# Patient Record
Sex: MALE | URBAN - METROPOLITAN AREA
[De-identification: names, ages, dates, MRNs, and addresses within clinical notes are randomized per-mention and may not be internally consistent; named-entity substitution may affect disease eponyms.]

---

## 2017-12-13 ENCOUNTER — OFFICE VISIT - HEALTHEAST (OUTPATIENT)
Dept: INTERNAL MEDICINE | Facility: CLINIC | Age: 42
End: 2017-12-13

## 2017-12-13 DIAGNOSIS — Z52.4 KIDNEY DONOR: ICD-10-CM

## 2017-12-13 DIAGNOSIS — Z00.00 ENCOUNTER FOR PREVENTIVE HEALTH EXAMINATION: ICD-10-CM

## 2017-12-13 DIAGNOSIS — J30.89 PERENNIAL ALLERGIC RHINITIS: ICD-10-CM

## 2017-12-13 LAB
CHOLEST SERPL-MCNC: 286 MG/DL
FASTING STATUS PATIENT QL REPORTED: YES
HDLC SERPL-MCNC: 64 MG/DL
LDLC SERPL CALC-MCNC: 192 MG/DL
TRIGL SERPL-MCNC: 150 MG/DL

## 2017-12-13 ASSESSMENT — MIFFLIN-ST. JEOR: SCORE: 1743.12

## 2017-12-14 ENCOUNTER — COMMUNICATION - HEALTHEAST (OUTPATIENT)
Dept: INTERNAL MEDICINE | Facility: CLINIC | Age: 42
End: 2017-12-14

## 2017-12-14 DIAGNOSIS — E78.5 HYPERLIPIDEMIA: ICD-10-CM

## 2017-12-15 ENCOUNTER — COMMUNICATION - HEALTHEAST (OUTPATIENT)
Dept: ONCOLOGY | Facility: HOSPITAL | Age: 42
End: 2017-12-15

## 2018-01-17 ENCOUNTER — COMMUNICATION - HEALTHEAST (OUTPATIENT)
Dept: ONCOLOGY | Facility: HOSPITAL | Age: 43
End: 2018-01-17

## 2018-02-05 ENCOUNTER — AMBULATORY - HEALTHEAST (OUTPATIENT)
Dept: INTERNAL MEDICINE | Facility: CLINIC | Age: 43
End: 2018-02-05

## 2018-02-05 ENCOUNTER — COMMUNICATION - HEALTHEAST (OUTPATIENT)
Dept: INTERNAL MEDICINE | Facility: CLINIC | Age: 43
End: 2018-02-05

## 2018-02-05 RX ORDER — ATORVASTATIN CALCIUM 20 MG/1
20 TABLET, FILM COATED ORAL DAILY
Qty: 30 TABLET | Refills: 11 | Status: SHIPPED | OUTPATIENT
Start: 2018-02-05

## 2018-02-06 ENCOUNTER — AMBULATORY - HEALTHEAST (OUTPATIENT)
Dept: INTERNAL MEDICINE | Facility: CLINIC | Age: 43
End: 2018-02-06

## 2018-02-06 ENCOUNTER — COMMUNICATION - HEALTHEAST (OUTPATIENT)
Dept: INTERNAL MEDICINE | Facility: CLINIC | Age: 43
End: 2018-02-06

## 2018-02-06 DIAGNOSIS — E78.5 HYPERLIPIDEMIA: ICD-10-CM

## 2018-09-19 ENCOUNTER — COMMUNICATION - HEALTHEAST (OUTPATIENT)
Dept: INTERNAL MEDICINE | Facility: CLINIC | Age: 43
End: 2018-09-19

## 2018-09-20 ENCOUNTER — AMBULATORY - HEALTHEAST (OUTPATIENT)
Dept: NURSING | Facility: CLINIC | Age: 43
End: 2018-09-20

## 2021-05-31 VITALS — WEIGHT: 193.7 LBS | HEIGHT: 68 IN | BODY MASS INDEX: 29.36 KG/M2

## 2021-06-01 ENCOUNTER — RECORDS - HEALTHEAST (OUTPATIENT)
Dept: ADMINISTRATIVE | Facility: CLINIC | Age: 46
End: 2021-06-01

## 2021-06-14 NOTE — PROGRESS NOTES
ASSESSMENT:      1. 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     2. Perennial allergic rhinitis  Mild no need for treatment.  Seems related to working around dust in old houses    3. Kidney donor  No history of kidney problems.    - Basic Metabolic Panel      PLAN:  Get fasting lipid profile to screen for high risk hyperlipidemia, given td. Get BMP given history of nephrectomy as living related donor.   Refer to genetic testing as his sister has tested positive for hereditary breast and ovary cancer    Health diet and lifestyle are discussed.       Patient Instructions   Follow general health habits, discussed.  Tetanus booster today.  Will check lipid profile today, fasting, given family history in father of CAD.  Refer to genetic counseling, given sister's recent positive genetic testing for hereditary ovary cancer.  No clinical need at this time for prostate or pancreas testing.      Orders Placed This Encounter   Procedures     Td, Preservative Free (green label)     Lipid [facility] FASTING     Order Specific Question:   Fasting is required?     Answer:   Yes     Basic Metabolic Panel     Ambulatory referral to Genetics     Referral Priority:   Routine     Referral Type:   Consultation     Referral Reason:   Evaluation and Treatment     Requested Specialty:   Genetics     Number of Visits Requested:   1     There are no discontinued medications.    Return if symptoms worsen or fail to improve.    CHIEF COMPLAINT:  Chief Complaint   Patient presents with     Establish Care     pt would like to discuss family propensity for cancer.     HISTORY OF PRESENT ILLNESS:  Stevie Kaur is a 42 y.o. male he feels well.  His sister recently developed localized ovarian cancer.  She is 17 years post renal transplant for non-diabetic renal failure.  She tested  "positive for hereditary ovary/breast cancer.  He gave her his kidney 17 years ago.  Healthy ROS is essentially negative in all systems.  Non-smoker, no alcohol, no history of drug use or abuse.      REVIEW OF SYSTEMS:   See HPI All other systems are negative.    TOBACCO USE:  History   Smoking Status     Never Smoker   Smokeless Tobacco     Never Used       VITALS:  Vitals:    12/13/17 1055   BP: 118/84   Patient Site: Right Arm   Patient Position: Sitting   Cuff Size: Adult Regular   Pulse: 69   Weight: 193 lb 11.2 oz (87.9 kg)   Height: 5' 8\" (1.727 m)     Wt Readings from Last 3 Encounters:   12/13/17 193 lb 11.2 oz (87.9 kg)       PHYSICAL EXAM:  Constitutional:  Well appearing in NAD  Ears:  Clear.  Oropharynx:  Without posterior nasal drainage or thrush.  Neck:  Supple, thyroid not palpable.  Cardiac:  Regular rate and rhythm without murmurs, rubs, or gallops. Palpation of the radial pulse regular.  Lungs: Clear.  Respiratory effort normal.  Abdomen:   Bowel sounds positive, nontender, nondistended.   : testes, penis, and scrotum normal on palpation and exam.   Rheumatologic: Normal joints and nails of the hands.  Neurologic:  Motor and sensory are intact,  Speech is clear.     Psychiatric:  Mood appropriate, memory intact.         "

## 2021-06-16 PROBLEM — J30.89 PERENNIAL ALLERGIC RHINITIS: Chronic | Status: ACTIVE | Noted: 2017-12-13

## 2021-08-21 ENCOUNTER — HEALTH MAINTENANCE LETTER (OUTPATIENT)
Age: 46
End: 2021-08-21

## 2021-10-16 ENCOUNTER — HEALTH MAINTENANCE LETTER (OUTPATIENT)
Age: 46
End: 2021-10-16

## 2022-10-01 ENCOUNTER — HEALTH MAINTENANCE LETTER (OUTPATIENT)
Age: 47
End: 2022-10-01

## 2023-10-15 ENCOUNTER — HEALTH MAINTENANCE LETTER (OUTPATIENT)
Age: 48
End: 2023-10-15